# Patient Record
(demographics unavailable — no encounter records)

---

## 2017-02-02 NOTE — XMS REPORT
Continuity of Care Document

 Created on: 2016



VASILIY SEE

External Reference #: [id number]

: 1961

Sex: Male



Demographics







 Address  [address]  [place] [address]

 

 Home Phone  [phone]

 

 Preferred Language  English

 

 Marital Status  Unknown

 

 Synagogue Affiliation  Unknown

 

 Race  Unknown

 

 Ethnic Group  Unknown





Author







 Author  [facility]

 

 Organization  [facility]

 

 Address  Unknown

 

 Phone  Unavailable







Support







 Name  Relationship  Address  Phone

 

 SU TUCKER MD  Caregiver  [address], [address]

[place] [address] [phone]

 

 ALBINO SEE  Next Of Kin  [address] [place] [address]  [phone]







Care Team Providers







 Care Team Member Name  Role  Phone

 

 SU TUCKER MD  PCP  [phone]







Insurance Providers







 Payer Name  Policy Number  Subscriber Name  Relationship

 

 [id number]  Vasiliy See  18 Self / Same As Patient







Advance Directives







 Directive  Response  Recorded Date/Time

 

 Advance Directives  No  16 9:00am

 

 Health Care Power of   No  16 9:00am

 

 Resuscitation Status  Full Code  16 9:00am







Problems

No problem information available.



Medications

No medication information available.



Social History







 Social History Problem  Response  Recorded Date/Time

 

 Alcohol Use  Denies Use  2016 9:00am

 

 Recreational Drug Use  No  2016 9:00am

 

 Recent Foreign Travel  No  2016 9:00am

 

 Recent Infectious Disease Exposure  No  2016 9:00am

 

 Sexually Transmitted Disease  No  2016 9:00am

 

 HIV/AIDS  No  2016 9:00am

 

 Smoking Status  Never a Smoker  2016 9:00am

 

 Recent Hopitalizations  Y 2016-NECK SURGERY  2016 9:00am

 

 Sexually Transmitted Disease  No  2016 9:00am









 Query  Response  Start Date  Stop Date

 

 Smoking Status  Never a Smoker      







Hospital Discharge Instructions

No hospital discharge instructions.



Plan of Care







 Discharge Date  16 12:45pm

 

 Instructions/Education Provided  Colon Polyps (DC)

 

 Prescriptions  See Medication Section







Functional Status

No functional status results.



Allergies, Adverse Reactions, Alerts







 Allergen  Type  Severity  Reaction  Status  Last Updated

 

 Codeine  Allergy  Intermediate  ITCHING/RASH  Active  16

 

 fentanyl ([id number])  Allergy  Intermediate  GI UPSET  Active  16







Immunizations

No immunization records.



Vital Signs

Acute Vital Signs





 Vital  Response  Date/Time

 

 Temperature (Fahrenheit)  98.1 degrees F (97.6 - 99.5)  2016 12:45pm

 

 Temperature (Calculated Celsius)  36.60506 degrees C (36.4 - 37.5)  2016 
12:45pm

 

 Temperature Source  Tympanic  2016 12:45pm

 

 Pulse Rate (adult)  76 bpm (60 - 90)  2016 12:45pm

 

 Respiratory Rate  18 bpm (12 - 24)  2016 12:45pm

 

 O2 Sat by Pulse Oximetry  98 % (88 - 100)  2016 12:45pm

 

 Blood Pressure  152/96 mm Hg  2016 12:45pm

 

    Blood Pressure Mean  109 mm Hg  2016 9:00am

 

 Pain      

 

    Numeric Pain Scale  0-No Pain  2016 12:45pm

 

    Pain Intensity  0  2016 12:30pm

 

 Height (Feet)  5 feet  2016 11:37am

 

 Height (Inches)  11.00 inches  2016 11:37am

 

 Height (Calculated Centimeters)  180.113887 cm  2016 11:37am

 

 Weight (Pounds)  210 pounds  2016 11:37am

 

 Weight (Ounces)  0.0 oz  2016 11:37am

 

 Weight (Calculated Grams)  18805.40 gm  2016 11:37am

 

 Weight (Calculated Kilograms)  95.233253 kilograms  2016 11:37am

 

 Calculated BMI  29.3  2016 11:37am







Results

No known relevant diagnostic tests, laboratory data and/or discharge summary.



Procedures

No known history of procedures.



Encounters







 Encounter  Location  Arrival/Admit Date  Discharge/Depart Date  Attending 
Provider

 

 Departed Surgical Day Care  [facility]  16 8:41am   12:45pm  SU TUCKER MD

 

 Departed Clinic  [facility]  16 5:41am  16 11:
59am  SU TUCKER MD

## 2019-01-02 NOTE — DIAGNOSTIC IMAGING REPORT
PROCEDURE: MRI lumbar spine.



TECHNIQUE: Multiplanar, multisequence MRI of the lumbar spine was

performed without contrast.



INDICATION: Worsening pseudoclaudication. 



COMPARISON: MRI lumbar spine without contrast 12/07/2016.



FINDINGS: There are five lumbar-type vertebral bodies for the

purposes of this report. Stable straightening of the normal

cervical lordosis. Alignment is otherwise unremarkable. There is

degenerative fusion of L5-S1. Moderate diffuse degenerative

endplate changes. Bone marrow signal is otherwise unremarkable.



No abnormal signal in the conus which terminates at L2. There is

normal morphology of the cauda equina. Simple-appearing cyst in

the right kidney is partially visualized but measures at least

4.2 cm.



L1-L2: Facet arthropathy and annular disc bulge result in mild

bilateral lateral recess narrowing. No substantial spinal canal

or neural foraminal narrowing.



L2-L3: Broad-based disc bulge, ligamentous hypertrophy, and facet

arthropathy all contribute to advanced spinal canal narrowing,

progressed since the prior exam. There is also advanced bilateral

neural foraminal narrowing. Increased fluid within the facet

joints, similar to the prior exam.



L3-L4: Annular disc bulge, ligamentous hypertrophy, facet

arthropathy, and a left synovial facet cyst all contribute to

advanced spinal canal narrowing. This has progressed since the

prior exam. There is also moderate-to-advanced bilateral neural

foraminal narrowing.



L4-L5: No substantial spinal canal or lateral recess narrowing.

Moderate bilateral neural foraminal narrowing.



L5-S1: No spinal canal or lateral recess narrowing. Disc space

height loss contributes to moderate bilateral neural foraminal

narrowing.



IMPRESSION: 

1. Interval progression of spondylotic changes now results in

advanced spinal canal narrowing at L2-L3 and L3-L4.

2. Multilevel moderate and advanced neural foraminal narrowing

also detailed above level by level.

3. No acute osseous findings.



Dictated by: 



  Dictated on workstation # [id number]

## 2019-10-04 NOTE — DIAGNOSTIC IMAGING REPORT
INDICATION: Left submandibular pain.



COMPARISON: None available.



TECHNIQUE: Targeted sonographic imaging of the left submandibular

region was performed.



FINDINGS: There is no loculated fluid collection to indicate

drainable abscess. Left parotid gland has a heterogeneous

echogenicity which is expected. No collection within the parotid

gland. The right parotid gland was imaged for comparison and have

a similar appearance.



IMPRESSION:

1. No drainable abscess or lymphadenopathy within the neck.

2. The site of focal pain corresponds to the left submandibular

gland and this could be due to acute parotiditis in the proper

setting.



Dictated by: 



  Dictated on workstation # [id number]

## 2021-03-15 NOTE — DIAGNOSTIC IMAGING REPORT
INDICATION: 

Chemical exposure.



TECHNIQUE: 

Two views of the chest were obtained.



FINDINGS: 

The heart size, mediastinal configuration, and pulmonary

vascularity are within normal limits. There is no pleural

effusion, pneumothorax, or pneumonia. The osseous structures are

unremarkable. 



IMPRESSION: 

No acute cardiopulmonary abnormality.



Dictated by: 



  Dictated on workstation # [id number]

## 2021-09-23 NOTE — DIAGNOSTIC IMAGING REPORT
EXAMINATION: Magnetic resonance imaging (MRI) of the cervical

spine without contrast



HISTORY: Back pain.



TECHNIQUE: Multiplanar multi-weighted MRI of the cervical spine

was performed without intravenous contrast using the standard

cervical spine protocol.



COMPARISON: Prior cervical spine 06/01/2016.



FINDINGS: Surgical changes from prior spinal fusion at C5-C6.

There is spinal fusion at this location. The alignment of the

cervical spine is otherwise normal. There may be mild edema seen

along the screws at C5 and C6 as well as within the inferior

endplate of C4. No acute fracture is seen. The craniocervical

junction is normal. The visualized portions of the skull base and

the posterior fossa are normal. The spinal cord demonstrates

normal signal intensity on all sequences. Intervertebral disks

have normal height and signal intensity. Surgical changes of the

posterior neck. Normal signal voids are present in the vertebral

arteries. 



C2-C3: Unremarkable. 



C3-C4: Mild left uncovertebral hypertrophy. No central canal

stenosis. Mild left neuroforaminal stenosis.



C4-C5: Mild right uncovertebral hypertrophy. No central canal

stenosis. Mild right neuroforaminal stenosis.



C5-C6: Small disc osteophyte complex. Mild right neuroforaminal

stenosis. Central canal is patent.



C6-C7: Mild disc osteophyte complex. Mild central canal stenosis.

Mild bilateral neuroforaminal stenosis.



C7-T1: Moderate right and mild left uncovertebral hypertrophy.

Moderate right neuroforaminal stenosis. Mild central canal

stenosis.



IMPRESSION:

1. Up to mild central canal stenosis at C6-C7 and C7-T1.

2. Degenerative changes in the cervical spine resulting in up to

moderate right neuroforaminal stenosis at C7-T1. Additional

levels of mild foraminal stenosis. 



Dictated by: 



  Dictated on workstation # [id number]